# Patient Record
Sex: MALE | Race: WHITE
[De-identification: names, ages, dates, MRNs, and addresses within clinical notes are randomized per-mention and may not be internally consistent; named-entity substitution may affect disease eponyms.]

---

## 2017-01-28 ENCOUNTER — HOSPITAL ENCOUNTER (OUTPATIENT)
Dept: HOSPITAL 53 - M LAB REF | Age: 22
End: 2017-01-28
Attending: PHYSICIAN ASSISTANT
Payer: COMMERCIAL

## 2017-01-28 DIAGNOSIS — R30.0: Primary | ICD-10-CM

## 2017-01-28 LAB — INTERNAL CH/GC CONTROL: (no result)

## 2018-03-26 ENCOUNTER — HOSPITAL ENCOUNTER (OUTPATIENT)
Dept: HOSPITAL 53 - M OUTALCOH | Age: 23
End: 2018-03-26
Attending: PSYCHIATRY & NEUROLOGY
Payer: COMMERCIAL

## 2018-03-26 DIAGNOSIS — F12.10: Primary | ICD-10-CM

## 2018-03-27 LAB
AMPHETAMINES UR QL SCN: NEGATIVE
BARBITURATES UR QL SCN: NEGATIVE
BENZODIAZ UR QL SCN: NEGATIVE
BZE UR QL SCN: NEGATIVE
CANNABINOIDS UR QL SCN: (no result)
METHADONE URINE REFLEX: NEGATIVE
OPIATES UR QL SCN: NEGATIVE
PCP UR QL SCN: NEGATIVE

## 2019-03-13 ENCOUNTER — HOSPITAL ENCOUNTER (OUTPATIENT)
Dept: HOSPITAL 53 - M LAB REF | Age: 24
End: 2019-03-13
Attending: PHYSICIAN ASSISTANT
Payer: MEDICAID

## 2019-03-13 DIAGNOSIS — J11.1: Primary | ICD-10-CM

## 2019-03-13 LAB
FLUAV RNA UPPER RESP QL NAA+PROBE: NEGATIVE
FLUBV RNA UPPER RESP QL NAA+PROBE: NEGATIVE

## 2019-03-31 ENCOUNTER — HOSPITAL ENCOUNTER (EMERGENCY)
Dept: HOSPITAL 53 - M ED | Age: 24
Discharge: HOME | End: 2019-03-31
Payer: MEDICAID

## 2019-03-31 VITALS — BODY MASS INDEX: 32.65 KG/M2 | WEIGHT: 220.46 LBS | HEIGHT: 69 IN

## 2019-03-31 VITALS — DIASTOLIC BLOOD PRESSURE: 81 MMHG | SYSTOLIC BLOOD PRESSURE: 124 MMHG

## 2019-03-31 DIAGNOSIS — N39.0: ICD-10-CM

## 2019-03-31 DIAGNOSIS — Z79.899: ICD-10-CM

## 2019-03-31 DIAGNOSIS — F17.200: ICD-10-CM

## 2019-03-31 DIAGNOSIS — N20.1: Primary | ICD-10-CM

## 2019-03-31 LAB
ALBUMIN SERPL BCG-MCNC: 3.8 GM/DL (ref 3.2–5.2)
ALT SERPL W P-5'-P-CCNC: 36 U/L (ref 12–78)
AMYLASE SERPL-CCNC: 60 U/L (ref 25–115)
BASOPHILS NFR BLD MANUAL: 1 % (ref 0–4)
BILIRUB CONJ SERPL-MCNC: < 0.1 MG/DL (ref 0–0.2)
BILIRUB SERPL-MCNC: 0.3 MG/DL (ref 0.2–1)
BUN SERPL-MCNC: 11 MG/DL (ref 7–18)
CALCIUM SERPL-MCNC: 8.8 MG/DL (ref 8.5–10.1)
CHLORIDE SERPL-SCNC: 109 MEQ/L (ref 98–107)
CO2 SERPL-SCNC: 22 MEQ/L (ref 21–32)
CREAT SERPL-MCNC: 1.19 MG/DL (ref 0.7–1.3)
EOSINOPHIL NFR BLD MANUAL: 1 % (ref 0–5)
GFR SERPL CREATININE-BSD FRML MDRD: > 60 ML/MIN/{1.73_M2} (ref 60–?)
GLUCOSE SERPL-MCNC: 128 MG/DL (ref 70–100)
HCT VFR BLD AUTO: 42.5 % (ref 42–52)
HGB BLD-MCNC: 14.6 G/DL (ref 13.5–17.5)
LIPASE SERPL-CCNC: 168 U/L (ref 73–393)
LYMPHOCYTES NFR BLD MANUAL: 37 % (ref 16–52)
MCH RBC QN AUTO: 30.7 PG (ref 27–33)
MCHC RBC AUTO-ENTMCNC: 34.4 G/DL (ref 32–36.5)
MCV RBC AUTO: 89.5 FL (ref 80–96)
MONOCYTES NFR BLD MANUAL: 3 % (ref 0–8)
NEUTROPHILS NFR BLD MANUAL: 54 % (ref 35–75)
PLATELET # BLD AUTO: 251 10^3/UL (ref 150–450)
PLATELET BLD QL SMEAR: NORMAL
POTASSIUM SERPL-SCNC: 3.4 MEQ/L (ref 3.5–5.1)
PROT SERPL-MCNC: 7.6 GM/DL (ref 6.4–8.2)
RBC # BLD AUTO: 4.75 10^6/UL (ref 4.3–6.1)
SODIUM SERPL-SCNC: 141 MEQ/L (ref 136–145)
VARIANT LYMPHS NFR BLD MANUAL: 4 % (ref 0–5)
WBC # BLD AUTO: 13.8 10^3/UL (ref 4–10)

## 2019-03-31 PROCEDURE — 36415 COLL VENOUS BLD VENIPUNCTURE: CPT

## 2019-03-31 PROCEDURE — 80076 HEPATIC FUNCTION PANEL: CPT

## 2019-03-31 PROCEDURE — 82150 ASSAY OF AMYLASE: CPT

## 2019-03-31 PROCEDURE — 74176 CT ABD & PELVIS W/O CONTRAST: CPT

## 2019-03-31 PROCEDURE — 85025 COMPLETE CBC W/AUTO DIFF WBC: CPT

## 2019-03-31 PROCEDURE — 99284 EMERGENCY DEPT VISIT MOD MDM: CPT

## 2019-03-31 PROCEDURE — 81001 URINALYSIS AUTO W/SCOPE: CPT

## 2019-03-31 PROCEDURE — 83690 ASSAY OF LIPASE: CPT

## 2019-03-31 PROCEDURE — 80048 BASIC METABOLIC PNL TOTAL CA: CPT

## 2019-03-31 NOTE — REPVR
EXAM: 

 CT Abdomen and Pelvis Without Contrast 



EXAM DATE/TIME: 

 3/31/2019 7:50 AM 



CLINICAL HISTORY: 

 24 years old, male; Pain; Abdominal pain; Flank; Left; Prior surgery; Surgery 

date: 6+ months; Surgery type: Appy; Additional info: Left flank pain; R/O 

kidney stone 



TECHNIQUE: 

 Imaging protocol: Axial computed tomography images of the abdomen and pelvis 

without contrast. 

  Coronal and sagittal reformatted images were created and reviewed. 

 Radiation optimization: All CT scans at this facility use at least one of 

these dose optimization techniques: automated exposure control; mA and/or kV 

adjustment per patient size (includes targeted exams where dose is matched to 

clinical indication); or iterative reconstruction. 



COMPARISON: 

 No relevant prior studies available. 



FINDINGS: 

 Lower thorax:  There is left lung base small consolidation measuring 4.3 x 3.1 

cm. 



ABDOMEN: 

 Liver: Normal. No mass. 

 Gallbladder and bile ducts: Normal. No calcified stones. No ductal dilation. 

 Pancreas: Normal. No ductal dilation. 

 Spleen: Normal. No splenomegaly. 

 Adrenals: Normal. No mass. 

 Kidneys and ureters:  There is a 2 mm left UVJ stone with mild proximal 

hydronephrosis and hydroureter. 

 Stomach and bowel: Normal. No obstruction. No mucosal thickening. 

 Appendix:  The patient is status post appendectomy. 



PELVIS: 

 Bladder:  The urinary bladder is collapsed limiting its evaluation. 

 Reproductive: Unremarkable as visualized. 



ABDOMEN and PELVIS: 

 Intraperitoneal space: Normal. No free air. No significant fluid collection. 

 Bones/joints: No acute fracture. No dislocation. 

 Soft tissues: Unremarkable. 

 Vasculature: Normal. No abdominal aortic aneurysm. 

 Lymph nodes: Normal. No enlarged lymph nodes. 



IMPRESSION: 

1. 2 mm left UVJ stone with mild proximal hydronephrosis and hydroureter. 

2. Status post appendectomy. 

3. 4.3 x 3.1 cm left lower lung zone irregular density likely early/small focal 

consolidation. Correlate clinically. Follow up is suggested. 



Electronically signed by: Randall Raya On 03/31/2019  08:08:47 AM

## 2019-03-31 NOTE — ED PDOC
Post-Departure Follow-Up


[facility] faxed formal report of ct abd/p for fu mlg Lundborg-Gray,Maja MD          Mar 31, 2019 10:51

## 2019-10-29 ENCOUNTER — HOSPITAL ENCOUNTER (EMERGENCY)
Dept: HOSPITAL 53 - M ED | Age: 24
Discharge: HOME | End: 2019-10-29
Payer: COMMERCIAL

## 2019-10-29 VITALS
WEIGHT: 220.46 LBS | SYSTOLIC BLOOD PRESSURE: 132 MMHG | HEIGHT: 68 IN | BODY MASS INDEX: 33.41 KG/M2 | DIASTOLIC BLOOD PRESSURE: 76 MMHG

## 2019-10-29 DIAGNOSIS — L08.9: Primary | ICD-10-CM

## 2019-10-29 DIAGNOSIS — F17.200: ICD-10-CM

## 2019-10-30 ENCOUNTER — HOSPITAL ENCOUNTER (EMERGENCY)
Dept: HOSPITAL 53 - M ED | Age: 24
Discharge: HOME | End: 2019-10-30
Payer: COMMERCIAL

## 2019-10-30 VITALS — BODY MASS INDEX: 36.19 KG/M2 | HEIGHT: 68 IN | WEIGHT: 238.76 LBS

## 2019-10-30 VITALS — DIASTOLIC BLOOD PRESSURE: 74 MMHG | SYSTOLIC BLOOD PRESSURE: 153 MMHG

## 2019-10-30 DIAGNOSIS — L03.115: ICD-10-CM

## 2019-10-30 DIAGNOSIS — L02.415: Primary | ICD-10-CM

## 2019-10-30 DIAGNOSIS — F17.210: ICD-10-CM

## 2019-10-30 LAB
ALBUMIN SERPL BCG-MCNC: 4.3 GM/DL (ref 3.2–5.2)
ALT SERPL W P-5'-P-CCNC: 49 U/L (ref 12–78)
BASOPHILS # BLD AUTO: 0.1 10^3/UL (ref 0–0.2)
BASOPHILS NFR BLD AUTO: 0.7 % (ref 0–1)
BILIRUB CONJ SERPL-MCNC: < 0.1 MG/DL (ref 0–0.2)
BILIRUB SERPL-MCNC: 0.3 MG/DL (ref 0.2–1)
BUN SERPL-MCNC: 14 MG/DL (ref 7–18)
CALCIUM SERPL-MCNC: 9.1 MG/DL (ref 8.5–10.1)
CHLORIDE SERPL-SCNC: 108 MEQ/L (ref 98–107)
CO2 SERPL-SCNC: 28 MEQ/L (ref 21–32)
CREAT SERPL-MCNC: 1.11 MG/DL (ref 0.7–1.3)
CRP SERPL-MCNC: 0.75 MG/DL (ref 0–0.3)
EOSINOPHIL # BLD AUTO: 0.3 10^3/UL (ref 0–0.5)
EOSINOPHIL NFR BLD AUTO: 2.7 % (ref 0–3)
ERYTHROCYTE [SEDIMENTATION RATE] IN BLOOD BY WESTERGREN METHOD: 18 MM/HR (ref 0–15)
GFR SERPL CREATININE-BSD FRML MDRD: > 60 ML/MIN/{1.73_M2} (ref 60–?)
GLUCOSE SERPL-MCNC: 96 MG/DL (ref 70–100)
HCT VFR BLD AUTO: 44.6 % (ref 42–52)
HGB BLD-MCNC: 15.2 G/DL (ref 13.5–17.5)
LYMPHOCYTES # BLD AUTO: 2.7 10^3/UL (ref 1.5–5)
LYMPHOCYTES NFR BLD AUTO: 27.5 % (ref 24–44)
MCH RBC QN AUTO: 31.3 PG (ref 27–33)
MCHC RBC AUTO-ENTMCNC: 34.1 G/DL (ref 32–36.5)
MCV RBC AUTO: 91.8 FL (ref 80–96)
MONOCYTES # BLD AUTO: 0.7 10^3/UL (ref 0–0.8)
MONOCYTES NFR BLD AUTO: 6.7 % (ref 0–5)
NEUTROPHILS # BLD AUTO: 6 10^3/UL (ref 1.5–8.5)
NEUTROPHILS NFR BLD AUTO: 62 % (ref 36–66)
PLATELET # BLD AUTO: 206 10^3/UL (ref 150–450)
POTASSIUM SERPL-SCNC: 4.1 MEQ/L (ref 3.5–5.1)
PROT SERPL-MCNC: 7.7 GM/DL (ref 6.4–8.2)
RBC # BLD AUTO: 4.86 10^6/UL (ref 4.3–6.1)
SODIUM SERPL-SCNC: 139 MEQ/L (ref 136–145)
WBC # BLD AUTO: 9.7 10^3/UL (ref 4–10)

## 2019-10-30 PROCEDURE — 85025 COMPLETE CBC W/AUTO DIFF WBC: CPT

## 2019-10-30 PROCEDURE — 96365 THER/PROPH/DIAG IV INF INIT: CPT

## 2019-10-30 PROCEDURE — 83605 ASSAY OF LACTIC ACID: CPT

## 2019-10-30 PROCEDURE — 80048 BASIC METABOLIC PNL TOTAL CA: CPT

## 2019-10-30 PROCEDURE — 36415 COLL VENOUS BLD VENIPUNCTURE: CPT

## 2019-10-30 PROCEDURE — 10060 I&D ABSCESS SIMPLE/SINGLE: CPT

## 2019-10-30 PROCEDURE — 85652 RBC SED RATE AUTOMATED: CPT

## 2019-10-30 PROCEDURE — 80076 HEPATIC FUNCTION PANEL: CPT

## 2019-10-30 PROCEDURE — 87070 CULTURE OTHR SPECIMN AEROBIC: CPT

## 2019-10-30 PROCEDURE — 87205 SMEAR GRAM STAIN: CPT

## 2019-10-30 PROCEDURE — 87186 SC STD MICRODIL/AGAR DIL: CPT

## 2019-10-30 PROCEDURE — 99284 EMERGENCY DEPT VISIT MOD MDM: CPT

## 2019-10-30 PROCEDURE — 87077 CULTURE AEROBIC IDENTIFY: CPT

## 2019-10-30 PROCEDURE — 86140 C-REACTIVE PROTEIN: CPT

## 2020-01-25 ENCOUNTER — HOSPITAL ENCOUNTER (OUTPATIENT)
Dept: HOSPITAL 53 - M RAD | Age: 25
End: 2020-01-25
Attending: PHYSICIAN ASSISTANT
Payer: COMMERCIAL

## 2020-01-25 DIAGNOSIS — M79.671: Primary | ICD-10-CM

## 2020-01-25 NOTE — REP
RIGHT FOOT COMPLETE:  01/25/2020.

 

Clinical history:  Trauma for a week.  Jumped off truck bed.

 

Findings: Four views show the distal tibia and fibula grossly intact. Subtalar

joints are preserved.  Calcaneus and talus are without a fracture.  The

talonavicular and calcaneocuboid joints appeared normal.  No heel spurs are

noted.  Tarsal bones and their articulations were intact.  Metatarsals and

phalanges were grossly intact.  MTP and IP joints unremarkable.

 

Impression:

 

1.   There is no visible or displaced fracture, avulsion, subluxation or other

acute bony abnormality.

 

 

 

 

Electronically Signed by

Samuel Stewart MD 01/25/2020 07:56 P

## 2020-04-29 ENCOUNTER — HOSPITAL ENCOUNTER (OUTPATIENT)
Dept: HOSPITAL 53 - M LABSMTC | Age: 25
End: 2020-04-29
Attending: FAMILY MEDICINE
Payer: COMMERCIAL

## 2020-04-29 DIAGNOSIS — Z20.828: ICD-10-CM

## 2020-04-29 DIAGNOSIS — Z11.59: Primary | ICD-10-CM

## 2020-10-23 ENCOUNTER — HOSPITAL ENCOUNTER (OUTPATIENT)
Dept: HOSPITAL 53 - M SFHCPLAZ | Age: 25
End: 2020-10-23
Attending: PHYSICIAN ASSISTANT
Payer: COMMERCIAL

## 2020-10-23 DIAGNOSIS — Z00.00: Primary | ICD-10-CM

## 2020-10-23 DIAGNOSIS — Z13.220: ICD-10-CM

## 2020-10-23 DIAGNOSIS — Z13.1: ICD-10-CM

## 2020-10-23 DIAGNOSIS — Z13.29: ICD-10-CM

## 2020-10-23 LAB
ALBUMIN SERPL BCG-MCNC: 4.4 GM/DL (ref 3.2–5.2)
ALT SERPL W P-5'-P-CCNC: 30 U/L (ref 12–78)
BILIRUB SERPL-MCNC: 0.5 MG/DL (ref 0.2–1)
BUN SERPL-MCNC: 11 MG/DL (ref 7–18)
CALCIUM SERPL-MCNC: 9.5 MG/DL (ref 8.5–10.1)
CHLORIDE SERPL-SCNC: 105 MEQ/L (ref 98–107)
CHOLEST SERPL-MCNC: 203 MG/DL (ref ?–200)
CHOLEST/HDLC SERPL: 5.64 {RATIO} (ref ?–5)
CO2 SERPL-SCNC: 26 MEQ/L (ref 21–32)
CREAT SERPL-MCNC: 1.1 MG/DL (ref 0.7–1.3)
EST. AVERAGE GLUCOSE BLD GHB EST-MCNC: 108 MG/DL (ref 60–110)
GFR SERPL CREATININE-BSD FRML MDRD: > 60 ML/MIN/{1.73_M2} (ref 60–?)
GLUCOSE SERPL-MCNC: 81 MG/DL (ref 70–100)
HDLC SERPL-MCNC: 36 MG/DL (ref 40–?)
LDLC SERPL CALC-MCNC: 128 MG/DL (ref ?–100)
NONHDLC SERPL-MCNC: 167 MG/DL
POTASSIUM SERPL-SCNC: 4.3 MEQ/L (ref 3.5–5.1)
PROT SERPL-MCNC: 8 GM/DL (ref 6.4–8.2)
SODIUM SERPL-SCNC: 137 MEQ/L (ref 136–145)
T4 FREE SERPL-MCNC: 1.08 NG/DL (ref 0.76–1.46)
TRIGL SERPL-MCNC: 195 MG/DL (ref ?–150)
TSH SERPL DL<=0.005 MIU/L-ACNC: 0.94 UIU/ML (ref 0.36–3.74)

## 2020-11-21 ENCOUNTER — HOSPITAL ENCOUNTER (EMERGENCY)
Dept: HOSPITAL 53 - M ED | Age: 25
Discharge: HOME | End: 2020-11-21
Payer: COMMERCIAL

## 2020-11-21 VITALS — DIASTOLIC BLOOD PRESSURE: 87 MMHG | SYSTOLIC BLOOD PRESSURE: 138 MMHG

## 2020-11-21 VITALS — BODY MASS INDEX: 35.62 KG/M2 | WEIGHT: 235.01 LBS | HEIGHT: 68 IN

## 2020-11-21 DIAGNOSIS — N50.812: Primary | ICD-10-CM

## 2020-11-21 DIAGNOSIS — R10.2: ICD-10-CM

## 2020-11-21 DIAGNOSIS — F17.200: ICD-10-CM

## 2020-11-21 LAB
CHLAMYDIA DNA AMPLIFICATION: NEGATIVE
N GONORRHOEA RRNA SPEC QL NAA+PROBE: NEGATIVE

## 2020-11-21 NOTE — REPVR
PROCEDURE INFORMATION: 

Exam: US Scrotum 

Exam date and time: 11/21/2020 7:41 PM 

Age: 25 years old 

Clinical indication: Scrotum pain; Additional info: Left testicular pain 



TECHNIQUE: 

Imaging protocol: Real-time ultrasound of the scrotum and contents with color 

Doppler and image documentation. 



COMPARISON: 

No relevant prior studies available. 



FINDINGS: 

Right testicle: Normal. No mass. No torsion. Normal vascular flow. 

Left testicle: Normal. No mass. No torsion. Normal vascular flow. 

Epididymides: Normal. 

Scrotum: Normal. 



IMPRESSION: 

Normal scrotal ultrasound.  



Electronically signed by: Troy Muñoz On 11/21/2020  19:58:28 PM

## 2020-12-01 ENCOUNTER — HOSPITAL ENCOUNTER (OUTPATIENT)
Dept: HOSPITAL 53 - M WHC | Age: 25
End: 2020-12-01
Attending: PHYSICIAN ASSISTANT
Payer: COMMERCIAL

## 2020-12-01 DIAGNOSIS — R10.30: Primary | ICD-10-CM

## 2020-12-01 DIAGNOSIS — Z90.49: ICD-10-CM

## 2020-12-01 NOTE — REP
INDICATION:

R10.30 LOWER ABDOMINAL PAIN.  Pain at the site of prior appendectomy.



COMPARISON:

Comparison CT study March 31, 2019..



TECHNIQUE:

Transabdominal scanning is performed in the area the patient's pain in the lower

abdomen/suprapubic region.



FINDINGS:

Urinary bladder was empty prior to scanning.  Unable to visualize seminal vesicles or

prostate.  Scanning in the area of the patient's pain in the pelvic region shows no

abnormality.  There is no evidence of free fluid, mass, cyst or abdominal wall defect..



IMPRESSION:

Unremarkable lower abdominal sonography in the area the patient's prior appendectomy

and pain.  No abnormality noted..





<Electronically signed by Don Moses > 12/01/20 [id number]

## 2020-12-04 ENCOUNTER — HOSPITAL ENCOUNTER (EMERGENCY)
Dept: HOSPITAL 53 - M ED | Age: 25
Discharge: LEFT BEFORE DISCHARGE | End: 2020-12-04
Payer: COMMERCIAL

## 2020-12-04 VITALS
DIASTOLIC BLOOD PRESSURE: 82 MMHG | WEIGHT: 225.47 LBS | HEIGHT: 68 IN | BODY MASS INDEX: 34.17 KG/M2 | SYSTOLIC BLOOD PRESSURE: 133 MMHG

## 2020-12-04 DIAGNOSIS — Z53.21: Primary | ICD-10-CM

## 2021-06-05 ENCOUNTER — HOSPITAL ENCOUNTER (OUTPATIENT)
Dept: HOSPITAL 53 - M LABSMTC | Age: 26
End: 2021-06-05
Attending: ANESTHESIOLOGY
Payer: COMMERCIAL

## 2021-06-05 DIAGNOSIS — Z20.822: ICD-10-CM

## 2021-06-05 DIAGNOSIS — Z01.812: Primary | ICD-10-CM

## 2021-06-10 ENCOUNTER — HOSPITAL ENCOUNTER (OUTPATIENT)
Dept: HOSPITAL 53 - M OPP | Age: 26
Discharge: HOME | End: 2021-06-10
Attending: INTERNAL MEDICINE
Payer: COMMERCIAL

## 2021-06-10 VITALS — WEIGHT: 226 LBS | HEIGHT: 67 IN | BODY MASS INDEX: 35.47 KG/M2

## 2021-06-10 VITALS — SYSTOLIC BLOOD PRESSURE: 127 MMHG | DIASTOLIC BLOOD PRESSURE: 74 MMHG

## 2021-06-10 DIAGNOSIS — F17.210: ICD-10-CM

## 2021-06-10 DIAGNOSIS — K64.8: ICD-10-CM

## 2021-06-10 DIAGNOSIS — R10.84: ICD-10-CM

## 2021-06-10 DIAGNOSIS — K63.5: Primary | ICD-10-CM

## 2021-06-10 NOTE — ROOR
________________________________________________________________________________

Patient Name: Shiva Salazar            Procedure Date: 6/10/2021 11:59 AM

MRN: [id number]                          Account Number: [id number]

YOB: 1995               Age: 26

Room: formerly [facility]                            Gender: Male

Note Status: Finalized                 

________________________________________________________________________________

 

Procedure:            Colonoscopy

Indications:          Abdominal pain, groin/suprapubic

Providers:            Damian Cramer MD

Referring MD:         Alesia Johansen

Requesting Provider:  

Medicines:            Monitored Anesthesia Care

Complications:        No immediate complications.

________________________________________________________________________________

Procedure:            Pre-Anesthesia Assessment:

                      - The heart rate, respiratory rate, oxygen saturations, 

                      blood pressure, adequacy of pulmonary ventilation, and 

                      response to care were monitored throughout the procedure.

                      The Colonoscope was introduced through the anus and 

                      advanced to 15 cm into the ileum. The colonoscopy was 

                      performed without difficulty. The patient tolerated the 

                      procedure well. The quality of the bowel preparation was 

                      good.

                                                                                

Findings:

     The perianal and digital rectal examinations were normal.

     A diminutive polyp was found in the proximal sigmoid colon. The polyp was 

     sessile. The polyp was removed with a cold snare. Resection and retrieval 

     were complete.

     Internal hemorrhoids were found during retroflexion. The hemorrhoids were 

     moderate.

     Retroflexion in the right colon was performed.

     The exam was otherwise normal throughout the examined colon.

     The terminal ileum appeared normal.

                                                                                

Impression:           - One diminutive polyp in the proximal sigmoid colon, 

                      removed with a cold snare. Resected and retrieved.

                      - Internal hemorrhoids.

                      - The Colon is otherwise normal.

                      - The examined portion of the ileum was normal.

                      (- Cause for groin and bladder pain is not detemined on 

                      this exam).

Recommendation:       - Telephone endoscopist for pathology results in 2 weeks.

                      - If the pathology report reveals adenomatous tissue, 

                      then repeat the colonoscopy for surveillance in 5 years.

                      - Use fiber, for example Citrucel, Fibercon, Konsyl or 

                      Metamucil.

                      - Return to referring physician as previously scheduled.

                                                                                

Procedure Code(s):    --- Professional ---

                      32596, Colonoscopy, flexible; with removal of tumor(s), 

                      polyp(s), or other lesion(s) by snare technique

Diagnosis Code(s):    --- Professional ---

                      R10.9, Unspecified abdominal pain

                      K64.8, Other hemorrhoids

                      K63.5, Polyp of colon

 

CPT copyright 2019 [employer]. All rights reserved.

 

The codes documented in this report are preliminary and upon  review may 

be revised to meet current compliance requirements.

 

Damian Cramer MD

________________

Damian Cramer MD

6/10/2021 12:26:45 PM

Electronically signed by Damian Cramer MD

Number of Addenda: 0

 

Note Initiated On: 6/10/2021 11:59 AM

Estimated Blood Loss: Estimated blood loss: none.

## 2022-03-31 ENCOUNTER — HOSPITAL ENCOUNTER (EMERGENCY)
Dept: HOSPITAL 53 - M ED | Age: 27
Discharge: LEFT BEFORE DISCHARGE | End: 2022-03-31
Payer: COMMERCIAL

## 2022-03-31 VITALS — HEIGHT: 68 IN | BODY MASS INDEX: 31.89 KG/M2 | WEIGHT: 210.43 LBS

## 2022-03-31 VITALS — SYSTOLIC BLOOD PRESSURE: 164 MMHG | DIASTOLIC BLOOD PRESSURE: 86 MMHG

## 2022-03-31 DIAGNOSIS — Z53.29: Primary | ICD-10-CM
